# Patient Record
(demographics unavailable — no encounter records)

---

## 2017-02-18 NOTE — ED INITIAL ASSESSMENT (HPI)
Patient relates she began feeling \"something is wrong\" but is unable to be more specific. Patient relates feeling \" too many emotions\". Patient anxious, but denies SI/HI. Denies pain. Denies any complaint.

## 2017-02-19 NOTE — ED PROVIDER NOTES
Whitney Thurman #[id number]  (18 year old F)        ED-[id number]         Bernie Lopes, NAVEENW [facility] Counselor Signed  [facility] Comprehensive Assessment 2/18/2017  7:41 PM   Related encounter: Assessment from 2/18/2017 in [facility] PARENTS  Reason Patient is Here Today: AS ABOVE  Family's Biggest Areas of Concern: NONE CURRENTLY.  PT'S EPISODE HAS SUBSIDED SINCE SHORTLY AFTER ARRIVAL TO THE ER.  PARENTS DENY ANY CONCERNS ABOUT SELF-HARM OR SUICIDE.      Referral Source  Referral Sour WEEK FOR INCREASED ANXIETY.     Bipolar Symptoms: Increased energy  Sleep Pattern: Naps during the day;Restlessness (TAKES 2 NAPS Q DAY D/T POOR SLEEP @ NIGHT / VERY RETLESS, EARLY RISER  /  CHRONIC ISSUES)  Number of Sleep Hours: 3 Hours (PER HER FIT-BIT  1  Used substances (other than as prescribed) in the past 30 days?: Yes  Chemical Abuse Screen  Used substances (other than as prescribed) in the past 30 days?: Yes  Chemical 1  Type of Other Chemical Used: Alcohol (describe)  Amount/Frequency: 3 X WEEKLY Level: Oriented X4  Thought Characteristics: Alert; Coherent;Logical;Organized  Judgment: Good  Insight: Poor (Comment)         Level of Care Recommendations  Consulted with: Chalo Jc MD  Level of Care Recommendation: Outpatient  Outpatient Criteria

## 2017-02-19 NOTE — ED PROVIDER NOTES
Patient Seen in: [facility] Emergency Department    History   Patient presents with:  Eval-P (psychiatric)    Stated Complaint:     HPI    45-year-old white female who presents to the emergency room today for complaint of need psych eval.  Patient a Oral Cap,  Take 1 capsule (70 mg total) by mouth every morning. ARIPiprazole (ABILIFY) 20 MG Oral Tab,  Take 1 tablet by mouth daily.    alprazolam (XANAX) 0.5 MG Oral Tab,  TAKE ONE TABLET BY MOUTH TWO TIMES A DAY AS NEEDED FOR SLEEP       Family History palpated. Extremity exam reveals no clubbing cyanosis or edema. Patient has good radial pulses noted bilaterally in the upper extremities .     There are no rashes noted on skin exam.      ED Course   Labs Reviewed - No data to display    MDM   Patient

## 2018-04-05 PROCEDURE — 87510 GARDNER VAG DNA DIR PROBE: CPT | Performed by: INTERNAL MEDICINE

## 2018-04-05 PROCEDURE — 87480 CANDIDA DNA DIR PROBE: CPT | Performed by: INTERNAL MEDICINE

## 2018-04-05 PROCEDURE — 87660 TRICHOMONAS VAGIN DIR PROBE: CPT | Performed by: INTERNAL MEDICINE

## (undated) NOTE — ED AVS SNAPSHOT
[facility] Emergency Department    [place]  [address]    [address] [place] [address]    Phone:  [phone]    Fax:  [phone]           Vu Storm   MRN: [id number]    Department:  [facility] Emergency Department   Date of Visit:  2/1 Si usted tiene algun problema con mullen sequimiento, por favor llame a nuestro adminstrador de vasyl al [phone]    Expect to receive an electronic request (by e-mail or text) to complete a self-assessment the day after your visit.   You may also receiv [facility] [address] ([address]) [address]  ([address]) [address] [facility] ([address]) [address] [facility] [address] (8

## (undated) NOTE — ED AVS SNAPSHOT
[facility] Emergency Department    [address] [place] [address]    Phone:  [phone]    Fax:  [phone]           Justice Costa   MRN: [id number]    Department:  [facility] Emergency Department   Date of Visit:  2/1 IF THERE IS ANY CHANGE OR WORSENING OF YOUR CONDITION, CALL YOUR PRIMARY CARE PHYSICIAN AT ONCE OR RETURN IMMEDIATELY TO THE EMERGENCY DEPARTMENT.     If you have been prescribed any medication(s), please fill your prescription right away and begin taking t